# Patient Record
Sex: FEMALE | Race: WHITE | NOT HISPANIC OR LATINO | RURAL
[De-identification: names, ages, dates, MRNs, and addresses within clinical notes are randomized per-mention and may not be internally consistent; named-entity substitution may affect disease eponyms.]

---

## 2023-01-08 ENCOUNTER — HOSPITAL ENCOUNTER (EMERGENCY)
Facility: HOSPITAL | Age: 63
Discharge: HOME | End: 2023-01-08
Payer: COMMERCIAL

## 2023-01-08 VITALS
HEIGHT: 62 IN | SYSTOLIC BLOOD PRESSURE: 179 MMHG | OXYGEN SATURATION: 93 % | WEIGHT: 125 LBS | HEART RATE: 110 BPM | RESPIRATION RATE: 18 BRPM | BODY MASS INDEX: 23 KG/M2 | DIASTOLIC BLOOD PRESSURE: 93 MMHG | TEMPERATURE: 99 F

## 2023-01-08 DIAGNOSIS — J10.1 INFLUENZA A: Primary | ICD-10-CM

## 2023-01-08 DIAGNOSIS — J44.1 COPD EXACERBATION: ICD-10-CM

## 2023-01-08 DIAGNOSIS — S09.90XA INJURY OF HEAD, INITIAL ENCOUNTER: ICD-10-CM
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
## 2023-01-08 DIAGNOSIS — R05.9 COUGH: ICD-10-CM

## 2023-01-08 LAB
FLUAV AG UPPER RESP QL IA.RAPID: POSITIVE
FLUBV AG UPPER RESP QL IA.RAPID: NEGATIVE
SARS-COV+SARS-COV-2 AG RESP QL IA.RAPID: NEGATIVE

## 2023-01-08 PROCEDURE — 99284 EMERGENCY DEPT VISIT MOD MDM: CPT | Mod: ,,, | Performed by: NURSE PRACTITIONER

## 2023-01-08 PROCEDURE — 99285 EMERGENCY DEPT VISIT HI MDM: CPT | Mod: 25

## 2023-01-08 PROCEDURE — 99284 PR EMERGENCY DEPT VISIT,LEVEL IV: ICD-10-PCS | Mod: ,,, | Performed by: NURSE PRACTITIONER

## 2023-01-08 PROCEDURE — 87428 SARSCOV & INF VIR A&B AG IA: CPT | Performed by: NURSE PRACTITIONER

## 2023-01-08 PROCEDURE — 25000242 PHARM REV CODE 250 ALT 637 W/ HCPCS: Performed by: NURSE PRACTITIONER

## 2023-01-08 RX ORDER — PREDNISONE 50 MG/1
50 TABLET ORAL DAILY
Qty: 5 TABLET | Refills: 0 | Status: SHIPPED | OUTPATIENT
Start: 2023-01-08 | End: 2023-01-13

## 2023-01-08 RX ORDER — ALBUTEROL SULFATE 90 UG/1
2 AEROSOL, METERED RESPIRATORY (INHALATION) EVERY 4 HOURS PRN
Qty: 8 G | Refills: 0 | Status: SHIPPED | OUTPATIENT
Start: 2023-01-08

## 2023-01-08 RX ORDER — ALBUTEROL SULFATE 90 UG/1
4 AEROSOL, METERED RESPIRATORY (INHALATION)
Status: COMPLETED | OUTPATIENT
Start: 2023-01-08 | End: 2023-01-08

## 2023-01-08 RX ORDER — AZITHROMYCIN 250 MG/1
250 TABLET, FILM COATED ORAL DAILY
Qty: 6 TABLET | Refills: 0 | Status: SHIPPED | OUTPATIENT
Start: 2023-01-08 | End: 2023-01-08 | Stop reason: SDUPTHER

## 2023-01-08 RX ORDER — AZITHROMYCIN 250 MG/1
250 TABLET, FILM COATED ORAL DAILY
Qty: 6 TABLET | Refills: 0 | Status: SHIPPED | OUTPATIENT
Start: 2023-01-08

## 2023-01-08 RX ADMIN — ALBUTEROL SULFATE 4 PUFF: 90 AEROSOL, METERED RESPIRATORY (INHALATION) at 09:01

## 2023-01-08 NOTE — ED TRIAGE NOTES
PRESENTS TO ER WITH COMPLAINT OF HEADACHE, REPORTS CURRENTLY AT [facility] FOR METHADONE OVERDOSE 1 WEEK AGO. WENT TO HOSPITAL AND WAS EVALUATED AND THEN PLACED IN [facility]. REPORTS SHE IS CONCERNED ABOUT COVID BUT PROVIDER AT [facility] THOUGHT SHE HAD STREP. SENT FOR EVALUATION

## 2023-01-08 NOTE — ED PROVIDER NOTES
Encounter Date: 1/8/2023       History     Chief Complaint   Patient presents with    Headache     62-year-old female presents to the emergency department to be evaluated for headache. She fell 1 week ago and hit her head. She said she was treated in an ED in [place] and is unsure whether she had a head CT or not, but she knows that she received Narcan.  She was brought to the emergency department by staff from [facility].  She is a smoker, has a history of COPD and uses albuterol as needed, but she is out of it.  Denies neck pain, back pain, chest pain, shortness of breath. She has also had a cough and runny nose for several days.     The history is provided by the patient.   Headache   This is a new problem. Associated symptoms include coughing and rhinorrhea. Pertinent negatives include no sore throat or weight loss.   Cough  This is a new problem. The current episode started several days ago. Associated symptoms include headaches, rhinorrhea, shortness of breath and wheezing. Pertinent negatives include no chest pain, no chills, no sweats, no weight loss, no sore throat and no myalgias.   Review of patient's allergies indicates:  No Known Allergies  Past Medical History:   Diagnosis Date    COPD (chronic obstructive pulmonary disease)     Diabetes mellitus     Hypertension      History reviewed. No pertinent surgical history.  History reviewed. No pertinent family history.  Social History     Tobacco Use    Smoking status: Every Day     Packs/day: 1.00     Years: 25.00     Pack years: 25.00     Types: Cigarettes    Smokeless tobacco: Never   Substance Use Topics    Drug use: Never     Review of Systems   Constitutional:  Negative for chills and weight loss.   HENT:  Positive for rhinorrhea. Negative for sore throat.    Respiratory:  Positive for cough, shortness of breath and wheezing.    Cardiovascular:  Negative for chest pain.   Musculoskeletal:  Negative for myalgias.   Neurological:  Positive for headaches.    All other systems reviewed and are negative.    Physical Exam     Initial Vitals [01/08/23 0836]   BP Pulse Resp Temp SpO2   (!) 180/98 110 16 99.1 °F (37.3 °C) (!) 94 %      MAP       --         Physical Exam    Vitals reviewed.  Constitutional: She appears well-developed and well-nourished.   HENT:   Head: Normocephalic and atraumatic.   Eyes: EOM are normal. Pupils are equal, round, and reactive to light.   Neck: Neck supple.   Cardiovascular:  Normal rate and regular rhythm.           Pulmonary/Chest: She has wheezes (Minimal expiratory wheeze).   Abdominal: Abdomen is soft. Bowel sounds are normal. She exhibits no distension and no mass. There is no abdominal tenderness. There is no rebound and no guarding.   Musculoskeletal:         General: No tenderness or edema. Normal range of motion.      Cervical back: Neck supple.     Neurological: She is alert and oriented to person, place, and time. She has normal strength. GCS score is 15. GCS eye subscore is 4. GCS verbal subscore is 5. GCS motor subscore is 6.   Skin: Skin is warm and dry. Capillary refill takes less than 2 seconds.   Psychiatric: She has a normal mood and affect.       Medical Screening Exam   See Full Note    ED Course   Procedures  Labs Reviewed   SARS-COV2 (COVID) W/ FLU ANTIGEN - Abnormal; Notable for the following components:       Result Value    Influenza A Positive (*)     All other components within normal limits    Narrative:     Negative SARS-CoV results should not be used as the sole basis for treatment or patient management decisions; negative results should be considered in the context of a patient's recent exposures, history and the presene of clinical signs and symptoms consistent with COVID-19.  Negative results should be treated as presumptive and confirmed by molecular assay, if necessary for patient management.          Imaging Results              X-Ray Chest PA And Lateral (Final result)  Result time 01/08/23 09:40:14       Final result by Kurt Rubio II, MD (01/08/23 09:40:14)                   Impression:      Chronic lung changes.  No acute process.      Electronically signed by: Kurt Rubio  Date:    01/08/2023  Time:    09:40               Narrative:    EXAMINATION:  XR CHEST PA AND LATERAL    CLINICAL HISTORY:  Cough, unspecified    COMPARISON:  None.    FINDINGS:  The heart and mediastinum are normal in size and configuration.  The pulmonary vascularity is normal in caliber. Lung volumes are increased with prominent bronchial markings.  No lung infiltrates, effusions, pneumothorax or other abnormality is demonstrated.                                       CT Head Without Contrast (Final result)  Result time 01/08/23 09:39:41      Final result by Kurt Rubio II, MD (01/08/23 09:39:41)                   Impression:      No evidence of abnormality demonstrated.      Electronically signed by: Kurt Rubio  Date:    01/08/2023  Time:    09:39               Narrative:    EXAMINATION:  CT HEAD WITHOUT CONTRAST    CLINICAL HISTORY:  Head trauma, moderate-severe;    TECHNIQUE:  Axial CT imaging of the brain is performed without contrast with 3 mm increments.    CT dose reduction technique used - Dose Rite and tube current modulation.    COMPARISON:  None available    FINDINGS:  No evidence of hemorrhage, mass, mass effect, midline shift or acute infarct seen.  The brain parenchyma attenuation and differentiation appears within normal limits. The ventricles and cisterns are normal in caliber.  No cranial or skull base abnormality is identified.                                       Medications   albuterol inhaler 4 puff (4 puffs Inhalation Given 1/8/23 0956)                       Clinical Impression:   Final diagnoses:  [R05.9] Cough  [J10.1] Influenza A (Primary)  [W19.XXXA] Fall, initial encounter  [S09.90XA] Injury of head, initial encounter  [J44.1] COPD exacerbation        ED Disposition Condition    Discharge  Stable          ED Prescriptions       Medication Sig Dispense Start Date End Date Auth. Provider    albuterol (PROVENTIL/VENTOLIN HFA) 90 mcg/actuation inhaler Inhale 2 puffs into the lungs every 4 (four) hours as needed for Wheezing. Rescue 8 g 1/8/2023 -- PHANI Connelly    predniSONE (DELTASONE) 50 MG Tab Take 1 tablet (50 mg total) by mouth once daily. for 5 days 5 tablet 1/8/2023 1/13/2023 PHANI Connelly    azithromycin (Z-SOFIA) 250 MG tablet Take 1 tablet (250 mg total) by mouth once daily. Take first 2 tablets together, then 1 every day until finished. 6 tablet 1/8/2023 -- PHANI Connelly          Follow-up Information    None          Salena Escamilla, PHANI  01/08/23 0954       Salena Escamilla, PHANI  01/08/23 1045       PHANI Connelly  01/08/23 1049       PHANI Connelly  01/08/23 1051       PHANI Connelly  01/08/23 1052

## 2023-01-08 NOTE — PROVIDER PROGRESS NOTES - EMERGENCY DEPT.
Encounter Date: 1/8/2023    ED Physician Progress Notes        MDM  62-year-old female presents to the emergency department to be evaluated for headache. She fell 1 week ago and hit her head. She said she was treated in an ED in [place] and is unsure whether she had a head CT or not, but she knows that she received Narcan.  She was brought to the emergency department by staff from [facility].  She is a smoker, has a history of COPD and uses albuterol as needed, but she is out of it.  Denies neck pain, back pain, chest pain, shortness of breath. She has also had a cough and runny nose for several days.   I ordered X-rays and personally reviewed them and reviewed the radiologist interpretation.  Xray significant for no acute process.    I ordered CT scan and personally reviewed it and reviewed the radiologist interpretation.  CT significant for no acute process    Discharge MDM    Patient was managed in the ED with albuterol.  The response to treatment was improved.  Patient was discharged in stable condition.  Detailed return precautions discussed.   Diagnosis:  COPD exacerbation, head injury, headache, influenza a